# Patient Record
Sex: MALE | URBAN - METROPOLITAN AREA
[De-identification: names, ages, dates, MRNs, and addresses within clinical notes are randomized per-mention and may not be internally consistent; named-entity substitution may affect disease eponyms.]

---

## 2024-08-08 ENCOUNTER — APPOINTMENT (RX ONLY)
Dept: URBAN - METROPOLITAN AREA CLINIC 12 | Facility: CLINIC | Age: 44
Setting detail: DERMATOLOGY
End: 2024-08-08

## 2024-08-08 DIAGNOSIS — Z41.9 ENCOUNTER FOR PROCEDURE FOR PURPOSES OTHER THAN REMEDYING HEALTH STATE, UNSPECIFIED: ICD-10-CM

## 2024-08-08 PROCEDURE — ? COSMETIC CONSULTATION: COOLSCULPTING

## 2024-08-08 ASSESSMENT — LOCATION DETAILED DESCRIPTION DERM
LOCATION DETAILED: RIGHT SUPERIOR LATERAL LOWER BACK
LOCATION DETAILED: RIGHT INFERIOR LATERAL MIDBACK
LOCATION DETAILED: LEFT SUPERIOR LATERAL LOWER BACK
LOCATION DETAILED: PERIUMBILICAL SKIN

## 2024-08-08 ASSESSMENT — LOCATION ZONE DERM: LOCATION ZONE: TRUNK

## 2024-08-08 ASSESSMENT — LOCATION SIMPLE DESCRIPTION DERM
LOCATION SIMPLE: ABDOMEN
LOCATION SIMPLE: LEFT LOWER BACK
LOCATION SIMPLE: RIGHT LOWER BACK

## 2024-08-15 ENCOUNTER — APPOINTMENT (RX ONLY)
Dept: URBAN - METROPOLITAN AREA CLINIC 12 | Facility: CLINIC | Age: 44
Setting detail: DERMATOLOGY
End: 2024-08-15

## 2024-08-15 DIAGNOSIS — Z41.9 ENCOUNTER FOR PROCEDURE FOR PURPOSES OTHER THAN REMEDYING HEALTH STATE, UNSPECIFIED: ICD-10-CM

## 2024-08-15 PROCEDURE — ? COOLSCULPTING

## 2024-08-15 NOTE — PROCEDURE: COOLSCULPTING
Time (Minutes - Will Only Render If Nonzero): 0
Suction Settings: The suction settings were per protocol.
Number Of Cycles: 1
Device: Coolsculpting
Consent: Written consent obtained, risks reviewed including, but not limited to, blistering from suction, darker or lighter pigmentary change, bruising, and/or need for multiple treatments.
Applicator Size: standard applicator
Location 1: flank (right)
Location 2: flank (left)
Detail Level: Zone
Other Location 1: Flank (right) applied above other flank location
Location 3: lower abdomen
Other Location 2: Flank (left) applied above other flank location
Other Location 3: Right right side
Patient Weight (Optional): 187 lbs
Intro: Prior to treatment, the area was cleaned with pre-treatment wipes and marked out with a marking pen. The gel sheet was then applied uniformly. The applicator was applied to the skin with good contact and suction.  Used CoolAdvantage (purple) applicator for all areas treated.
Post Treatment: After treatment, the suction was turned off, and the applicator was removed from the skin. Performed 2 minute massage after each treatment.  Patient tolerated treatment well.  Patient will come back in 4 weeks for post treatment assessment.
Other Location 4: Left side
Price (Use Numbers Only, No Special Characters Or $): 8289

## 2024-09-11 ENCOUNTER — APPOINTMENT (RX ONLY)
Dept: URBAN - METROPOLITAN AREA CLINIC 12 | Facility: CLINIC | Age: 44
Setting detail: DERMATOLOGY
End: 2024-09-11

## 2024-09-11 DIAGNOSIS — Z41.9 ENCOUNTER FOR PROCEDURE FOR PURPOSES OTHER THAN REMEDYING HEALTH STATE, UNSPECIFIED: ICD-10-CM

## 2024-09-11 PROCEDURE — ? COOLSCULPTING

## 2024-09-11 ASSESSMENT — LOCATION ZONE DERM: LOCATION ZONE: TRUNK

## 2024-09-11 ASSESSMENT — LOCATION SIMPLE DESCRIPTION DERM
LOCATION SIMPLE: LEFT LOWER BACK
LOCATION SIMPLE: RIGHT LOWER BACK
LOCATION SIMPLE: ABDOMEN

## 2024-09-11 ASSESSMENT — LOCATION DETAILED DESCRIPTION DERM
LOCATION DETAILED: RIGHT SUPERIOR LATERAL LOWER BACK
LOCATION DETAILED: PERIUMBILICAL SKIN
LOCATION DETAILED: LEFT SUPERIOR LATERAL LOWER BACK
LOCATION DETAILED: LEFT INFERIOR LATERAL LOWER BACK

## 2024-09-11 NOTE — PROCEDURE: COOLSCULPTING
Consent: Written consent obtained, risks reviewed including, but not limited to, blistering from suction, darker or lighter pigmentary change, bruising, and/or need for multiple treatments.
Time (Minutes - Will Only Render If Nonzero): 0
Suction Settings: The suction settings were per protocol.
Number Of Cycles: 1
Applicator Size: standard applicator
Location 1: flank (right)
Other Location 3: Right side, lower abdomen
Location 4: lower abdomen
Other Location 4: Left side lower abdomen
Patient Weight (Optional): 184
Other Location 1: Flank (right) above other right flank
Location 2: flank (left)
Detail Level: Zone
Intro: Prior to treatment, the area was cleaned with pre-treatment wipes and marked out with a marking pen. The gel sheet was then applied uniformly. The applicator was applied to the skin with good contact and suction.  Used CoolAdvantage (purple) applicator for all areas treated.
Other Location 2: Flank (left) applied above other left flank
Post Treatment: After treatment, the suction was turned off, and the applicator was removed from the skin. Performed 2 minute massage after each treatment.  Patient will come back in 4 weeks for post treatment assessment.
Price (Use Numbers Only, No Special Characters Or $): 8872
Device: Coolsculpting